# Patient Record
Sex: FEMALE | Race: WHITE | URBAN - METROPOLITAN AREA
[De-identification: names, ages, dates, MRNs, and addresses within clinical notes are randomized per-mention and may not be internally consistent; named-entity substitution may affect disease eponyms.]

---

## 2017-01-19 PROBLEM — T50.995A ADVERSE EFFECT OF OTHER DRUGS, MEDICAMENTS AND BIOLOGICAL SUBSTANCES, INITIAL ENCOUNTER: Status: ACTIVE | Noted: 2017-01-19

## 2017-01-19 PROBLEM — D50.8 IRON DEFICIENCY ANEMIA REFRACTORY TO IRON THERAPY: Status: ACTIVE | Noted: 2017-01-19

## 2018-02-05 ENCOUNTER — HOSPITAL ENCOUNTER (OUTPATIENT)
Dept: CT IMAGING | Age: 38
Discharge: HOME | End: 2018-02-05
Attending: NURSE PRACTITIONER | Admitting: NURSE PRACTITIONER

## 2018-02-05 DIAGNOSIS — D72.10 IDIOPATHIC EOSINOPHILIA: ICD-10-CM

## 2018-02-05 DIAGNOSIS — D72.10 EOSINOPHILIA: ICD-10-CM

## 2018-10-03 ENCOUNTER — APPOINTMENT (RX ONLY)
Dept: URBAN - METROPOLITAN AREA CLINIC 170 | Facility: CLINIC | Age: 38
Setting detail: DERMATOLOGY
End: 2018-10-03

## 2018-10-03 DIAGNOSIS — L57.8 OTHER SKIN CHANGES DUE TO CHRONIC EXPOSURE TO NONIONIZING RADIATION: ICD-10-CM

## 2018-10-03 PROBLEM — L23.7 ALLERGIC CONTACT DERMATITIS DUE TO PLANTS, EXCEPT FOOD: Status: ACTIVE | Noted: 2018-10-03

## 2018-10-03 PROBLEM — L70.0 ACNE VULGARIS: Status: ACTIVE | Noted: 2018-10-03

## 2018-10-03 PROBLEM — F32.9 MAJOR DEPRESSIVE DISORDER, SINGLE EPISODE, UNSPECIFIED: Status: ACTIVE | Noted: 2018-10-03

## 2018-10-03 PROCEDURE — ? CHEMICAL PEEL GLYCOLIC ACID

## 2018-10-03 ASSESSMENT — LOCATION ZONE DERM: LOCATION ZONE: FACE

## 2018-10-03 ASSESSMENT — LOCATION DETAILED DESCRIPTION DERM: LOCATION DETAILED: LEFT INFERIOR CENTRAL MALAR CHEEK

## 2018-10-03 ASSESSMENT — LOCATION SIMPLE DESCRIPTION DERM: LOCATION SIMPLE: LEFT CHEEK

## 2018-10-03 NOTE — PROCEDURE: CHEMICAL PEEL GLYCOLIC ACID
Consent: Prior to the procedure, written consent was obtained and risks were reviewed, including but not limited to: redness, peeling, blistering, pigmentary change, scarring, infection, and pain.
Time (Mins): 5
Post-Care Instructions: I reviewed with the patient in detail post-care instructions. Patient should avoid sun exposure and wear sun protection.
Treatment Number: 1
Price (Use Numbers Only, No Special Characters Or $): $100/4for$300
Post Peel Care: The peel was neutralized with sodium bicarbonate.  After the procedure, the treatment area was washed with soap and water, and a post-peel cream was applied. Sun protection and post-care instructions were reviewed with the patient.
Detail Level: Zone
Prep: The treated area was degreased with pre-peel cleanser (acetone), and vaseline was applied for protection of mucous membranes.
Glycolic Acid %: 50%
Number Of Passes (Layers): 3

## 2019-12-09 ENCOUNTER — APPOINTMENT (RX ONLY)
Dept: URBAN - METROPOLITAN AREA CLINIC 170 | Facility: CLINIC | Age: 39
Setting detail: DERMATOLOGY
End: 2019-12-09

## 2019-12-09 DIAGNOSIS — D22 MELANOCYTIC NEVI: ICD-10-CM

## 2019-12-09 DIAGNOSIS — D18.0 HEMANGIOMA: ICD-10-CM

## 2019-12-09 DIAGNOSIS — L81.4 OTHER MELANIN HYPERPIGMENTATION: ICD-10-CM

## 2019-12-09 DIAGNOSIS — L82.1 OTHER SEBORRHEIC KERATOSIS: ICD-10-CM

## 2019-12-09 DIAGNOSIS — L81.1 CHLOASMA: ICD-10-CM

## 2019-12-09 PROBLEM — D22.5 MELANOCYTIC NEVI OF TRUNK: Status: ACTIVE | Noted: 2019-12-09

## 2019-12-09 PROBLEM — D18.01 HEMANGIOMA OF SKIN AND SUBCUTANEOUS TISSUE: Status: ACTIVE | Noted: 2019-12-09

## 2019-12-09 PROCEDURE — ? IN-HOUSE DISPENSING PHARMACY

## 2019-12-09 PROCEDURE — ? FULL BODY SKIN EXAM

## 2019-12-09 PROCEDURE — ? SUNSCREEN RECOMMENDATIONS

## 2019-12-09 PROCEDURE — ? EDUCATIONAL RESOURCES PROVIDED

## 2019-12-09 PROCEDURE — 99213 OFFICE O/P EST LOW 20 MIN: CPT

## 2019-12-09 PROCEDURE — ? PHOTO-DOCUMENTATION

## 2019-12-09 PROCEDURE — ? COUNSELING

## 2019-12-09 ASSESSMENT — LOCATION SIMPLE DESCRIPTION DERM
LOCATION SIMPLE: LEFT CHEEK
LOCATION SIMPLE: RIGHT FOREHEAD
LOCATION SIMPLE: RIGHT LOWER BACK
LOCATION SIMPLE: RIGHT CHEEK
LOCATION SIMPLE: RIGHT SHOULDER
LOCATION SIMPLE: UPPER BACK
LOCATION SIMPLE: LEFT FOREHEAD
LOCATION SIMPLE: ABDOMEN

## 2019-12-09 ASSESSMENT — LOCATION DETAILED DESCRIPTION DERM
LOCATION DETAILED: RIGHT INFERIOR FOREHEAD
LOCATION DETAILED: RIGHT ANTERIOR SHOULDER
LOCATION DETAILED: LEFT FOREHEAD
LOCATION DETAILED: LEFT CENTRAL MALAR CHEEK
LOCATION DETAILED: RIGHT INFERIOR MEDIAL MIDBACK
LOCATION DETAILED: INFERIOR THORACIC SPINE
LOCATION DETAILED: EPIGASTRIC SKIN
LOCATION DETAILED: RIGHT INFERIOR LATERAL MALAR CHEEK

## 2019-12-09 ASSESSMENT — LOCATION ZONE DERM
LOCATION ZONE: ARM
LOCATION ZONE: TRUNK
LOCATION ZONE: FACE

## 2019-12-09 ASSESSMENT — SEVERITY ASSESSMENT: SEVERITY: MODERATE, MODERATELY DARKER THAN THE SURROUNDING NORMAL SKIN

## 2019-12-09 NOTE — PROCEDURE: IN-HOUSE DISPENSING PHARMACY
Product 37 Unit Type: mg
Product 73 Price/Unit (In Dollars): 0
Render Refills If Set To 0: Yes
Product 3 Price/Unit (In Dollars): 01
Product 3 Application Directions: Qpm
Product 1 Price/Unit (In Dollars): 60
Product 3 Unit Type: ml
Name Of Product 4: Obagi Tretinoin 0.05% cream
Product 1 Amount/Unit (Numbers Only): 20
Product 4 Application Directions: Apply 1-2x weekly at night, increase to QPM as tolerated
Product 2 Application Directions: Apply qam
Name Of Product 3: Latisse 0.03%
Detail Level: Zone
Name Of Product 1: Obagi Tretinoin 0.025%
Product 3 Amount/Unit (Numbers Only): 3
Product 1 Units Dispensed: 1
Name Of Product 11: R/Essentials Brightening pads + Hydroquinone 6%
Product 1 Application Directions: Apply 1-2x weekly at night before increasing to QPM as tolerated.
Product 1 Unit Type: grams
Product 11 Application Directions: apply bid to aa x 3 mo
Name Of Product 2: Obagi C-Clarifying serum N-D